# Patient Record
(demographics unavailable — no encounter records)

---

## 2024-11-04 NOTE — PLAN
[No] : No [Medication education provided] : Medication education provided. [Rationale for medication choices, possible risks/precautions, benefits, alternative treatment choices, and consequences of non-treatment discussed] : Rationale for medication choices, possible risks/precautions, benefits, alternative treatment choices, and consequences of non-treatment discussed with patient/family/caregiver  [FreeTextEntry5] : -psychoeducation about diagnosis and treatment modalities -Neuropsych result copy requested  - recommend [facility]BT [facility],  health referral sent -Lab/other tests: new school year  [place] forms reviewed  -Medication: Adderall 15mg ER Q am for school days -Safety: Emergency procedures were discussed -Patient given opportunity to ask questions and their questions were answered and they expressed understanding and agreement with above plan. -Follow up: RTC in 8-12 weeks for medication management

## 2024-11-04 NOTE — SOCIAL HISTORY
[FreeTextEntry1] : born and raised in [place]; mom - dentist , dad - radiologist , 2 sisters (18, 13); Describes close with family and "happy" childhood with close friendships; enjoys basketball, video games; Wants to be a plastic surgeon Substance use : none

## 2024-11-04 NOTE — REASON FOR VISIT
[Patient with collateral] : Patient with collateral  [Father] : father [FreeTextEntry1] : adhd  [TextEntry] : This visit was provided via telehealth using real-time 2-way audio visual technology HIPPA compliant [facility].  The patient, CAMILLE DAVE , was located at home, [address] [place] [address] , at the time of the visit. The provider, BEST OGDEN, was located at the [place] at the time of the visit.  The patient, CAMILLE DVAE and Provider participated in the telehealth encounter. Parent also participated.   Verbal consent given on Nov 04, 2024   by the Parent with patient CAMILLE DAVE  assent.

## 2024-11-04 NOTE — SOCIAL HISTORY
[FreeTextEntry1] : born and raised in [place]; mom - dentist , dad - radiologist , 2 sisters (18, 13); Describes close with family and "happy" childhood with close friendships; enjoys basketball, video games; Wants to be a plastic surgeon Substance use : none

## 2024-11-04 NOTE — DISCUSSION/SUMMARY
[FreeTextEntry1] : 15 yo male with ADHD-I and school year adjustment stressors; Started on Adderall by Dr. Cross, transferring care; Protective factors of supportive family and friends, looks to treatment favorably, as such with treatment adherence, prognosis is good.

## 2024-11-04 NOTE — HISTORY OF PRESENT ILLNESS
[FreeTextEntry1] : Patient is a 13 yo male, domiciled with bio parents and 2 sisters, currently enrolled in 9th regular education, with no history of accomodation services, currently in outpatient treatment with Dr. Cross, no prior psychiatric hospitalization, no self-injury or suicide attempts, no aggression/violence, no substance use, no legal issues, no CPS involvement, no trauma/abuse, PMH nut allergy and seasonal allergies, ADHD, presenting today with father for transfer of care, child psychiatrist is retiring;  Father states child has always struggled with inattentiveness though manageable, then upon starting high school with increase in demand of classes and self time management pt started to struggle academically; Parents spoke with teachers who endorsed child has inattentiveness in the classroom and easy distractibility states has often being told by teachers that he is talking excessively or joking around too much which was disruptive to the classes; Of note, child's uncle is a child psych fellow and gave Vanderbilts for parents and teachers to fill which father states were indicative of mild ADHD;  In November pt was seen by child psychiatrist who confrimed ADHD _ by clnical interview and started Adderall; Inital Se of tireness and flat affect,  and seemed disengaged "not his social self", parent states they were alarmed, however pt endorsed he was paying attention in class, found by december SE resolved; Pt states he likes medication and that he has been able to manage his time much better;  States his honors  tells him he makes less careless mistakes;  pt glad that by last marking period his grades improved; Pt denies any concerns about appetite and sleep; states acutely time constraints due to ramadan fasting and attends [employer] in evenings, however gets his home done earlier; Most school days waks on time by 6:30, has protein bar with breakfast; no appetite concerns during school day outside of fasting period. Plays basketball on the weekends; Also plays for school but season ended.   Pt denies any hx of depression/keven/psychosis/ed/trauma/OCD/substance use.  At this time pt and parent interested in continuing Adderall school days.  [FreeTextEntry2] : Nov '23 Child psychiatrist ADHD - I diagnosis via clinical interview , started adderall , effective

## 2024-11-04 NOTE — REASON FOR VISIT
[Patient with collateral] : Patient with collateral  [Father] : father [FreeTextEntry1] : adhd  [TextEntry] : This visit was provided via telehealth using real-time 2-way audio visual technology HIPPA compliant [facility].  The patient, CAMILLE DAVE , was located at home, [address] [place] [address] , at the time of the visit. The provider, BEST OGDEN, was located at the medical [place] at the time of the visit.  The patient, CAMILLE DAVE and Provider participated in the telehealth encounter. Parent also participated.   Verbal consent given on Nov 04, 2024   by the Parent with patient CAMILLE DAVE  assent.

## 2024-11-04 NOTE — PLAN
[No] : No [Medication education provided] : Medication education provided. [Rationale for medication choices, possible risks/precautions, benefits, alternative treatment choices, and consequences of non-treatment discussed] : Rationale for medication choices, possible risks/precautions, benefits, alternative treatment choices, and consequences of non-treatment discussed with patient/family/caregiver  [FreeTextEntry5] : -psychoeducation about diagnosis and treatment modalities -Neuropsych result copy requested  - recommend [facility]BT [facility],  health referral sent -Lab/other tests: new school year  [place] forms reviewed  -Medication: Adderall 15mg ER Q am for school days -Safety: Emergency procedures were discussed -Patient given opportunity to ask questions and their questions were answered and they expressed understanding and agreement with above plan. -Follow up: RTC in 8-12 weeks for medication management

## 2024-11-04 NOTE — PHYSICAL EXAM
[Well groomed] : well groomed [Average] : average [Cooperative] : cooperative [Euthymic] : euthymic [Full] : full [Clear] : clear [Underproductive] : underproductive [Linear/Goal Directed] : linear/goal directed [None] : none [None Reported] : none reported [Above average] : above average [WNL] : within normal limits [Positive interaction] : positive interaction [Unremarkable/age appropriate] : unremarkable/age appropriate [FreeTextEntry5] : slow to warm,  then cooperative

## 2024-11-04 NOTE — HISTORY OF PRESENT ILLNESS
[FreeTextEntry1] : Patient is a 13 yo male, domiciled with bio parents and 2 sisters, currently enrolled in 9th regular education, with no history of accomodation services, currently in outpatient treatment with Dr. Cross, no prior psychiatric hospitalization, no self-injury or suicide attempts, no aggression/violence, no substance use, no legal issues, no CPS involvement, no trauma/abuse, PMH nut allergy and seasonal allergies, ADHD, presenting today with father for transfer of care, child psychiatrist is retiring;  Father states child has always struggled with inattentiveness though manageable, then upon starting high school with increase in demand of classes and self time management pt started to struggle academically; Parents spoke with teachers who endorsed child has inattentiveness in the classroom and easy distractibility states has often being told by teachers that he is talking excessively or joking around too much which was disruptive to the classes; Of note, child's uncle is a child psych fellow and gave Vanderbilts for parents and teachers to fill which father states were indicative of mild ADHD;  In November pt was seen by child psychiatrist who confrimed ADHD _ by clnical interview and started Adderall; Inital Se of tireness and flat affect,  and seemed disengaged "not his social self", parent states they were alarmed, however pt endorsed he was paying attention in class, found by december SE resolved; Pt states he likes medication and that he has been able to manage his time much better;  States his honors  tells him he makes less careless mistakes;  pt glad that by last marking period his grades improved; Pt denies any concerns about appetite and sleep; states acutely time constraints due to ramadan fasting and attends [employer] in evenings, however gets his home done earlier; Most school days waks on time by 6:30, has protein bar with breakfast; no appetite concerns during school day outside of fasting period. Plays basketball on the weekends; Also plays for school but season ended.   Pt denies any hx of depression/keven/psychosis/ed/trauma/OCD/substance use.  At this time pt and parent interested in continuing Adderall school days.  [FreeTextEntry2] : Nov '23 Child psychiatrist ADHD - I diagnosis via clinical interview , started adderall , effective

## 2025-02-19 NOTE — PHYSICAL EXAM
[FreeTextEntry5] : most recent vitals: 5' 8"; lbs 145 ; 115/72 [Well groomed] : well groomed [Average] : average [Cooperative] : cooperative [Euthymic] : euthymic [Full] : full [Clear] : clear [Linear/Goal Directed] : linear/goal directed [None] : none [None Reported] : none reported [Above average] : above average [WNL] : within normal limits [Positive interaction] : positive interaction [Unremarkable/age appropriate] : unremarkable/age appropriate

## 2025-02-19 NOTE — HISTORY OF PRESENT ILLNESS
[FreeTextEntry1] : Patient is a 13 yo male, domiciled with bio parents and 2 sisters, currently enrolled in 9th regular education, with no history of accomodation services, currently in outpatient treatment with Dr. Cross, no prior psychiatric hospitalization, no self-injury or suicide attempts, no aggression/violence, no substance use, no legal issues, no CPS involvement, no trauma/abuse, PMH nut allergy and seasonal allergies, ADHD, presenting today with father for transfer of care, child psychiatrist is retiring;  Father states child has always struggled with inattentiveness though manageable, then upon starting high school with increase in demand of classes and self time management pt started to struggle academically; Parents spoke with teachers who endorsed child has inattentiveness in the classroom and easy distractibility states has often being told by teachers that he is talking excessively or joking around too much which was disruptive to the classes; Of note, child's uncle is a child psych fellow and gave Vanderbilts for parents and teachers to fill which father states were indicative of mild ADHD;  In November pt was seen by child psychiatrist who confrimed ADHD _ by clnical interview and started Adderall; Inital Se of tireness and flat affect,  and seemed disengaged "not his social self", parent states they were alarmed, however pt endorsed he was paying attention in class, found by december SE resolved; Pt states he likes medication and that he has been able to manage his time much better;  States his honors  tells him he makes less careless mistakes;  pt glad that by last marking period his grades improved; Pt denies any concerns about appetite and sleep; states acutely time constraints due to ramadan fasting and attends [employer] in evenings, however gets his home done earlier; Most school days waks on time by 6:30, has protein bar with breakfast; no appetite concerns during school day outside of fasting period. Plays basketball on the weekends; Also plays for school but season ended.   Pt denies any hx of depression/keven/psychosis/ed/trauma/OCD/substance use.  At this time pt and parent interested in continuing Adderall school days.  [FreeTextEntry2] : Nov '23 Child psychiatrist ADHD - I diagnosis via clinical interview , started adderall , effective  They have now completed neuropsych testing with  private psychologist 2024

## 2025-02-19 NOTE — REASON FOR VISIT
[Patient with collateral] : Patient with collateral  [Father] : father [FreeTextEntry1] : ADHD, adjustment stressor  [TextEntry] : This visit was provided via telehealth using real-time 2-way audio visual technology HIPPA compliant [facility].  The patient, CAMILLE ADVE , was located at home, [address] [place] [address] , at the time of the visit. The provider, BEST OGDEN, was located at the medical office [place] in [place] at the time of the visit.  The patient, CAMILLE DAVE and Provider participated in the telehealth encounter. Parent also participated.   Verbal consent given on Feb 19, 2025   by the Parent with patient CAMILLE DAVE  assent.

## 2025-02-19 NOTE — PLAN
[No] : No [Medication education provided] : Medication education provided. [Rationale for medication choices, possible risks/precautions, benefits, alternative treatment choices, and consequences of non-treatment discussed] : Rationale for medication choices, possible risks/precautions, benefits, alternative treatment choices, and consequences of non-treatment discussed with patient/family/caregiver  [FreeTextEntry5] :  -psychoeducation about diagnosis and treatment modalities -Neuropsych result copy requested - [facility]   -Lab/other tests:  school year [employer] forms   -Medication: Adderall 15mg ER Q am for school days -Patient , [parent given opportunity to ask questions and their questions were answered and they expressed understanding and agreement with above plan. -Follow up: RTC in 8-12 weeks for medication management       Psychotherapy documentation: Time spent for Psychotherapy: 16 minutes Modality: Supportive psychotherapy and psychoeducation pt and parent Goal: Reduction in symptoms of adhd . Focus is session: 1. Discussed daily activities that help elevate  mood and the behaviors and symptoms that interfere with  ability to maintain a  routine and structure ; EF skills for time management. 2. Psychoeducation about medications, risk vs benefits of medications, role of behavior activation and coping skills to help in improving  mood and anxiety symptoms and daily functioning.

## 2025-02-19 NOTE — DISCUSSION/SUMMARY
[FreeTextEntry1] : 15 yo male with ADHD-I and school year adjustment stressors, test anxiety ; Started on Adderall by Dr. Cross, transferring care; Protective factors of supportive family and friends, looks to treatment favorably, as such with treatment adherence, prognosis is good.

## 2025-02-19 NOTE — SOCIAL HISTORY
[FreeTextEntry1] : born and raised in [place]; mom - dentist , dad - radiologist , 2 sisters (18, 13); Describes close with family and "happy" childhood with close friendships; enjoys basketball, video games; Wants to be a plastic surgeon Substance use : none

## 2025-06-03 NOTE — REASON FOR VISIT
[Patient with collateral] : Patient with collateral  [Parents] : parents [FreeTextEntry1] : adhd  [TextEntry] : This visit was provided via telehealth using real-time 2-way audio visual technology HIPPA compliant [facility].  The patient, CAMILLE DAVE , was located at home, [address] [place] [address] , at the time of the visit. The provider, BEST OGDEN, was located at the medical [place] at the time of the visit.  The patient, CAMILLE DAVE and Provider participated in the telehealth encounter. Parent also participated.   Verbal consent given on Jun 03, 2025   by the Parent with patient CAMILEL DAVE  assent.

## 2025-06-03 NOTE — PLAN
[No] : No [Medication education provided] : Medication education provided. [Rationale for medication choices, possible risks/precautions, benefits, alternative treatment choices, and consequences of non-treatment discussed] : Rationale for medication choices, possible risks/precautions, benefits, alternative treatment choices, and consequences of non-treatment discussed with patient/family/caregiver  [FreeTextEntry5] : -psychoeducation about diagnosis and treatment modalities -Neuropsych result copy requested - [facility] -Lab/other tests: school year [employer] forms -Medication: Adderall 20mg ER Q am for school days advanced dated script for summer schedule : short acting Adderall 10mg prn summer school/SAT classes. Meds are secured with parent and to be dispensed to pt  -Patient , parent given opportunity to ask questions and their questions were answered and they expressed understanding and agreement with above plan. -Follow up: RTC in 8-12 weeks for medication management      Psychotherapy documentation: Time spent for Psychotherapy: 16 minutes Modality: Supportive psychotherapy and psychoeducation pt and parent Goal: Reduction in symptoms of adhd. Focus is session: 1. Discussed daily activities that help elevate mood and the behaviors and symptoms that interfere with ability to maintain a routine and structure ; EF skills for time management, healthy sleep habits.  2. Psychoeducation about medications, risk vs benefits of medications, role of behavior activation and coping skills to help in improving mood and anxiety symptoms and daily functioning.

## 2025-06-03 NOTE — PLAN
[No] : No [Medication education provided] : Medication education provided. [Rationale for medication choices, possible risks/precautions, benefits, alternative treatment choices, and consequences of non-treatment discussed] : Rationale for medication choices, possible risks/precautions, benefits, alternative treatment choices, and consequences of non-treatment discussed with patient/family/caregiver  [FreeTextEntry5] : -psychoeducation about diagnosis and treatment modalities -Neuropsych result copy requested - [facility] [place] -Lab/other tests: school year [employer] forms -Medication: Adderall 20mg ER Q am for school days advanced dated script for summer schedule : short acting Adderall 10mg prn summer school/SAT classes. Meds are secured with parent and to be dispensed to pt  -Patient , parent given opportunity to ask questions and their questions were answered and they expressed understanding and agreement with above plan. -Follow up: RTC in 8-12 weeks for medication management      Psychotherapy documentation: Time spent for Psychotherapy: 16 minutes Modality: Supportive psychotherapy and psychoeducation pt and parent Goal: Reduction in symptoms of adhd. Focus is session: 1. Discussed daily activities that help elevate mood and the behaviors and symptoms that interfere with ability to maintain a routine and structure ; EF skills for time management, healthy sleep habits.  2. Psychoeducation about medications, risk vs benefits of medications, role of behavior activation and coping skills to help in improving mood and anxiety symptoms and daily functioning.

## 2025-06-03 NOTE — HISTORY OF PRESENT ILLNESS
[FreeTextEntry1] : Patient is a 13 yo male, domiciled with bio parents and 2 sisters, currently enrolled in 9th regular education, with no history of accomodation services, currently in outpatient treatment with Dr. Cross, no prior psychiatric hospitalization, no self-injury or suicide attempts, no aggression/violence, no substance use, no legal issues, no CPS involvement, no trauma/abuse, PMH nut allergy and seasonal allergies, ADHD, presenting today with father for transfer of care, child psychiatrist is retiring;  Father states child has always struggled with inattentiveness though manageable, then upon starting high school with increase in demand of classes and self time management pt started to struggle academically; Parents spoke with teachers who endorsed child has inattentiveness in the classroom and easy distractibility states has often being told by teachers that he is talking excessively or joking around too much which was disruptive to the classes; Of note, child's uncle is a child psych fellow and gave Vanderbilts for parents and teachers to fill which father states were indicative of mild ADHD;  In November pt was seen by child psychiatrist who confrimed ADHD _ by clnical interview and started Adderall; Inital Se of tireness and flat affect,  and seemed disengaged "not his social self", parent states they were alarmed, however pt endorsed he was paying attention in class, found by december SE resolved; Pt states he likes medication and that he has been able to manage his time much better;  States his honors  tells him he makes less careless mistakes;  pt glad that by last marking period his grades improved; Pt denies any concerns about appetite and sleep; states acutely time constraints due to ramadan fasting and attends [employer] in evenings, however gets his home done earlier; Most school days waks on time by 6:30, has protein bar with breakfast; no appetite concerns during school day outside of fasting period. Plays basketball on the weekends; Also plays for school but season ended.   Pt denies any hx of depression/keven/psychosis/ed/trauma/OCD/substance use.  At this time pt and parent interested in continuing Adderall school days.  [FreeTextEntry2] : Nov '23 Child psychiatrist ADHD - I diagnosis via clinical interview , started adderall , effective  They have now completed neuropsych testing with  private psychologist 2024

## 2025-06-03 NOTE — HISTORY OF PRESENT ILLNESS
[FreeTextEntry1] : Patient is a 15 yo male, domiciled with bio parents and 2 sisters, currently enrolled in 9th regular education, with no history of accomodation services, currently in outpatient treatment with Dr. Cross, no prior psychiatric hospitalization, no self-injury or suicide attempts, no aggression/violence, no substance use, no legal issues, no CPS involvement, no trauma/abuse, PMH nut allergy and seasonal allergies, ADHD, presenting today with father for transfer of care, child psychiatrist is retiring;  Father states child has always struggled with inattentiveness though manageable, then upon starting high school with increase in demand of classes and self time management pt started to struggle academically; Parents spoke with teachers who endorsed child has inattentiveness in the classroom and easy distractibility states has often being told by teachers that he is talking excessively or joking around too much which was disruptive to the classes; Of note, child's uncle is a child psych fellow and gave Vanderbilts for parents and teachers to fill which father states were indicative of mild ADHD;  In November pt was seen by child psychiatrist who confrimed ADHD _ by clnical interview and started Adderall; Inital Se of tireness and flat affect,  and seemed disengaged "not his social self", parent states they were alarmed, however pt endorsed he was paying attention in class, found by december SE resolved; Pt states he likes medication and that he has been able to manage his time much better;  States his honors  tells him he makes less careless mistakes;  pt glad that by last marking period his grades improved; Pt denies any concerns about appetite and sleep; states acutely time constraints due to ramadan fasting and attends [employer] in evenings, however gets his home done earlier; Most school days waks on time by 6:30, has protein bar with breakfast; no appetite concerns during school day outside of fasting period. Plays basketball on the weekends; Also plays for school but season ended.   Pt denies any hx of depression/keven/psychosis/ed/trauma/OCD/substance use.  At this time pt and parent interested in continuing Adderall school days.  [FreeTextEntry2] : Nov '23 Child psychiatrist ADHD - I diagnosis via clinical interview , started adderall , effective  They have now completed neuropsych testing with  private psychologist 2024

## 2025-06-03 NOTE — PHYSICAL EXAM
[FreeTextEntry5] : most recent ht weight per parent report  5'11"; lbs 146  [Well groomed] : well groomed [Average] : average [Cooperative] : cooperative [Euthymic] : euthymic [Full] : full [Clear] : clear [Linear/Goal Directed] : linear/goal directed [None] : none [None Reported] : none reported [Above average] : above average [WNL] : within normal limits [Positive interaction] : positive interaction [Unremarkable/age appropriate] : unremarkable/age appropriate

## 2025-06-03 NOTE — SOCIAL HISTORY
[FreeTextEntry1] : born and raised in [place]; mom - dentist , dad - radiologist , 2 sisters (18, 13); Describes close with family and "happy" childhood with close friendships; enjoys basketball, video games; Wants to be a plastic surgeon Substance use : none

## 2025-06-03 NOTE — REASON FOR VISIT
[Patient with collateral] : Patient with collateral  [Parents] : parents [FreeTextEntry1] : adhd  [TextEntry] : This visit was provided via telehealth using real-time 2-way audio visual technology HIPPA compliant [facility].  The patient, CAMILLE DAVE , was located at home, [address] [place] [address] , at the time of the visit. The provider, BEST OGDEN, was located at the medical office located in [place] at the time of the visit.  The patient, CAMILLE DAVE and Provider participated in the telehealth encounter. Parent also participated.   Verbal consent given on Jun 03, 2025   by the Parent with patient CAMILLE DAVE  assent.

## 2025-06-03 NOTE — SOCIAL HISTORY
[FreeTextEntry1] : born and raised in [place]; mom - dentist , dad - radiologist , 2 sisters (18, 13); Describes close with family and "happy" childhood with close friendships; enjoys basketball, video games; Wants to be a plastic surgeon Substance use : none